# Patient Record
Sex: FEMALE | Race: OTHER | URBAN - METROPOLITAN AREA
[De-identification: names, ages, dates, MRNs, and addresses within clinical notes are randomized per-mention and may not be internally consistent; named-entity substitution may affect disease eponyms.]

---

## 2020-07-19 ENCOUNTER — EMERGENCY (EMERGENCY)
Facility: HOSPITAL | Age: 45
LOS: 0 days | Discharge: HOME | End: 2020-07-19
Attending: STUDENT IN AN ORGANIZED HEALTH CARE EDUCATION/TRAINING PROGRAM | Admitting: EMERGENCY MEDICINE
Payer: MEDICAID

## 2020-07-19 VITALS
RESPIRATION RATE: 18 BRPM | TEMPERATURE: 99 F | SYSTOLIC BLOOD PRESSURE: 114 MMHG | OXYGEN SATURATION: 99 % | HEART RATE: 68 BPM | DIASTOLIC BLOOD PRESSURE: 60 MMHG

## 2020-07-19 VITALS
WEIGHT: 190.04 LBS | RESPIRATION RATE: 18 BRPM | OXYGEN SATURATION: 100 % | DIASTOLIC BLOOD PRESSURE: 76 MMHG | TEMPERATURE: 100 F | HEART RATE: 86 BPM | SYSTOLIC BLOOD PRESSURE: 153 MMHG

## 2020-07-19 DIAGNOSIS — R06.02 SHORTNESS OF BREATH: ICD-10-CM

## 2020-07-19 LAB
ALBUMIN SERPL ELPH-MCNC: 4.3 G/DL — SIGNIFICANT CHANGE UP (ref 3.5–5.2)
ALP SERPL-CCNC: 51 U/L — SIGNIFICANT CHANGE UP (ref 30–115)
ALT FLD-CCNC: 17 U/L — SIGNIFICANT CHANGE UP (ref 0–41)
ANION GAP SERPL CALC-SCNC: 14 MMOL/L — SIGNIFICANT CHANGE UP (ref 7–14)
AST SERPL-CCNC: 15 U/L — SIGNIFICANT CHANGE UP (ref 0–41)
BASOPHILS # BLD AUTO: 0.02 K/UL — SIGNIFICANT CHANGE UP (ref 0–0.2)
BASOPHILS NFR BLD AUTO: 0.3 % — SIGNIFICANT CHANGE UP (ref 0–1)
BILIRUB SERPL-MCNC: <0.2 MG/DL — SIGNIFICANT CHANGE UP (ref 0.2–1.2)
BUN SERPL-MCNC: 13 MG/DL — SIGNIFICANT CHANGE UP (ref 10–20)
CALCIUM SERPL-MCNC: 9.1 MG/DL — SIGNIFICANT CHANGE UP (ref 8.5–10.1)
CHLORIDE SERPL-SCNC: 106 MMOL/L — SIGNIFICANT CHANGE UP (ref 98–110)
CO2 SERPL-SCNC: 18 MMOL/L — SIGNIFICANT CHANGE UP (ref 17–32)
CREAT SERPL-MCNC: 0.7 MG/DL — SIGNIFICANT CHANGE UP (ref 0.7–1.5)
D DIMER BLD IA.RAPID-MCNC: 428 NG/ML DDU — HIGH (ref 0–230)
EOSINOPHIL # BLD AUTO: 0.16 K/UL — SIGNIFICANT CHANGE UP (ref 0–0.7)
EOSINOPHIL NFR BLD AUTO: 2.1 % — SIGNIFICANT CHANGE UP (ref 0–8)
GLUCOSE SERPL-MCNC: 112 MG/DL — HIGH (ref 70–99)
HCT VFR BLD CALC: 37.8 % — SIGNIFICANT CHANGE UP (ref 37–47)
HGB BLD-MCNC: 12.7 G/DL — SIGNIFICANT CHANGE UP (ref 12–16)
IMM GRANULOCYTES NFR BLD AUTO: 0.3 % — SIGNIFICANT CHANGE UP (ref 0.1–0.3)
LYMPHOCYTES # BLD AUTO: 2.01 K/UL — SIGNIFICANT CHANGE UP (ref 1.2–3.4)
LYMPHOCYTES # BLD AUTO: 26.1 % — SIGNIFICANT CHANGE UP (ref 20.5–51.1)
MCHC RBC-ENTMCNC: 30.4 PG — SIGNIFICANT CHANGE UP (ref 27–31)
MCHC RBC-ENTMCNC: 33.6 G/DL — SIGNIFICANT CHANGE UP (ref 32–37)
MCV RBC AUTO: 90.4 FL — SIGNIFICANT CHANGE UP (ref 81–99)
MONOCYTES # BLD AUTO: 0.64 K/UL — HIGH (ref 0.1–0.6)
MONOCYTES NFR BLD AUTO: 8.3 % — SIGNIFICANT CHANGE UP (ref 1.7–9.3)
NEUTROPHILS # BLD AUTO: 4.86 K/UL — SIGNIFICANT CHANGE UP (ref 1.4–6.5)
NEUTROPHILS NFR BLD AUTO: 62.9 % — SIGNIFICANT CHANGE UP (ref 42.2–75.2)
NRBC # BLD: 0 /100 WBCS — SIGNIFICANT CHANGE UP (ref 0–0)
NT-PROBNP SERPL-SCNC: 60 PG/ML — SIGNIFICANT CHANGE UP (ref 0–300)
PLATELET # BLD AUTO: 253 K/UL — SIGNIFICANT CHANGE UP (ref 130–400)
POTASSIUM SERPL-MCNC: 4.1 MMOL/L — SIGNIFICANT CHANGE UP (ref 3.5–5)
POTASSIUM SERPL-SCNC: 4.1 MMOL/L — SIGNIFICANT CHANGE UP (ref 3.5–5)
PROT SERPL-MCNC: 7.2 G/DL — SIGNIFICANT CHANGE UP (ref 6–8)
RBC # BLD: 4.18 M/UL — LOW (ref 4.2–5.4)
RBC # FLD: 13.2 % — SIGNIFICANT CHANGE UP (ref 11.5–14.5)
SODIUM SERPL-SCNC: 138 MMOL/L — SIGNIFICANT CHANGE UP (ref 135–146)
TROPONIN T SERPL-MCNC: <0.01 NG/ML — SIGNIFICANT CHANGE UP
WBC # BLD: 7.71 K/UL — SIGNIFICANT CHANGE UP (ref 4.8–10.8)
WBC # FLD AUTO: 7.71 K/UL — SIGNIFICANT CHANGE UP (ref 4.8–10.8)

## 2020-07-19 PROCEDURE — 93010 ELECTROCARDIOGRAM REPORT: CPT

## 2020-07-19 PROCEDURE — 99285 EMERGENCY DEPT VISIT HI MDM: CPT

## 2020-07-19 PROCEDURE — 71045 X-RAY EXAM CHEST 1 VIEW: CPT | Mod: 26

## 2020-07-19 PROCEDURE — 71275 CT ANGIOGRAPHY CHEST: CPT | Mod: 26

## 2020-07-19 NOTE — ED PROVIDER NOTE - PROGRESS NOTE DETAILS
Pt signed out to Dr. Jones pending labs, reevaluation, and dispo. PP: Received sign out from Dr. Ortiz. Patient's D-dimer came back elevated, CT angio chest negative for PE. Patient reassessed, feels subjectively better. Discussed being admitted for further evaluation of SOB as inpatient, patient opts to go home and follow up with PMD and get workup outpatient if symptoms persist. Patient to be discharged from ED. Any available test results were discussed with patient and/or family. Verbal instructions given, including instructions to return to ED immediately for any new, worsening, or concerning symptoms. Patient endorsed understanding. Written discharge instructions additionally given, including follow-up plan.

## 2020-07-19 NOTE — ED PROVIDER NOTE - NS ED ROS FT
Eyes:  No visual changes, eye pain or discharge.  ENMT:  No hearing changes, pain, discharge or infections. No neck pain or stiffness.  Cardiac:  No chest pain, +SOB. No chest pain with exertion.  Respiratory:  No cough or respiratory distress. No hemoptysis. No history of asthma or RAD.  GI:  No nausea, vomiting, diarrhea or abdominal pain.  :  No dysuria, frequency or burning.  MS:  No myalgia, muscle weakness, joint pain or back pain.  Neuro:  No headache or weakness.  No LOC.  Skin:  No skin rash.   Endocrine: No history of thyroid disease or diabetes.

## 2020-07-19 NOTE — ED ADULT NURSE NOTE - OBJECTIVE STATEMENT
Pt c/o tremors, states she woke up from her sleep with tremors and SOB. Pt denies chest pains, no s/s respiratory distress. O2 Sat 100% on room air. No tremors observed at this time.

## 2020-07-19 NOTE — ED PROVIDER NOTE - NSFOLLOWUPCLINICS_GEN_ALL_ED_FT
[facility]  Medicine  [address], [place]   Phone: [phone]  Fax:   Follow Up Time: 4-6 Days

## 2020-07-19 NOTE — ED PROVIDER NOTE - OBJECTIVE STATEMENT
The patient is a 44 year old female with no PMH presenting for shortness of breath since last night. Patient states she woke up from sleep and could not catch her breath. shortness of breath is worse while laying down. has not had these symptoms before. Associated with a burning sensation in throat. No fevers, chest pain, nausea, vomiting, abdominal pain. No recent travel, surgeries, immobilizations, hemoptysis, hormone use.

## 2020-07-19 NOTE — ED ADULT NURSE NOTE - CHPI ED NUR SYMPTOMS NEG
no dizziness/no fever/no nausea/no tingling/no weakness/no chills/no pain/no vomiting/no decreased eating/drinking

## 2020-07-19 NOTE — ED PROVIDER NOTE - CLINICAL SUMMARY MEDICAL DECISION MAKING FREE TEXT BOX
Pt w/ SOB. resolved in ED. CTA neg PE. Work up negative for acute serious pathology requiring further emergent intervention or hospitalization. Pt stable for dc w/ PMD f/up, and care as discussed.  Pt/ family understands plan and signs and symptoms for ED return.

## 2020-07-19 NOTE — ED PROVIDER NOTE - PATIENT PORTAL LINK FT
You can access the FollowMyHealth Patient Portal offered by [facility] by registering at the following website: [email or web address]. By joining [employer]’s FollowMyHealth portal, you will also be able to view your health information using other applications (apps) compatible with our system.

## 2020-07-19 NOTE — ED PROVIDER NOTE - ATTENDING CONTRIBUTION TO CARE
44 y.o. female, no PMH, comes in c/o SOB which started last night, worse when laying flat, better when upright. States gets SOB when walking up the stairs. No CP. No fever/chills, cough, abdominal pain, n/v/c/d. No recent travel, surgeries, immobilizations, hemoptysis, hormone use. On exam, pt in NAD, AAOx3, head NC/AT, CN II-XII intact, lungs CTA B/L, CV S1S2 regular, abdomen soft/NT/ND/(+)BS, ext (-) edema, motor 5/5x4, sensation intact. Will do labs, CXR, EKG and reevaluate.

## 2024-09-19 ENCOUNTER — EMERGENCY (EMERGENCY)
Facility: HOSPITAL | Age: 49
LOS: 0 days | Discharge: HOME | End: 2024-09-19
Attending: EMERGENCY MEDICINE
Payer: MEDICAID

## 2024-09-19 VITALS
RESPIRATION RATE: 18 BRPM | HEART RATE: 82 BPM | SYSTOLIC BLOOD PRESSURE: 137 MMHG | DIASTOLIC BLOOD PRESSURE: 82 MMHG | WEIGHT: 190.04 LBS | TEMPERATURE: 99 F | OXYGEN SATURATION: 96 % | HEIGHT: 61 IN

## 2024-09-19 DIAGNOSIS — L02.212 CUTANEOUS ABSCESS OF BACK [ANY PART, EXCEPT BUTTOCK]: ICD-10-CM

## 2024-09-19 DIAGNOSIS — M79.7 FIBROMYALGIA: ICD-10-CM

## 2024-09-19 RX ORDER — CLINDAMYCIN PHOSPHATE 150 MG/ML
1 VIAL (ML) INJECTION
Qty: 21 | Refills: 0
Start: 2024-09-19 | End: 2024-09-25

## 2024-09-19 NOTE — ED PROVIDER NOTE - IV ALTEPLASE DOOR HIDDEN
Patient here today for lab review.  She called to have her labs checked that she has been having some intermittent dizzy spells and was concerned she may be anemic again.    Lab Results   Component Value Date    WBC 7.91 07/09/2021    HGB 12.8 07/09/2021    HCT 40.8 07/09/2021    MCV 92.1 07/09/2021     07/09/2021   Iron saturation 16%.  Lab Results   Component Value Date    IRON 53 07/09/2021    TIBC 325 07/09/2021    FERRITIN 102.60 07/09/2021     Labs reviewed with patient.  Recommend she follow-up with PCP for further evaluation of her complaints, or if symptoms worsening or concern of stroke go to the emergency room.  Patient verbalized understanding.    SUMMER Garcia   show

## 2024-09-19 NOTE — ED PROVIDER NOTE - CLINICAL SUMMARY MEDICAL DECISION MAKING FREE TEXT BOX
Patient tolerated the procedure well. There is no lymphangitic spread visible. Low concern for osteomyelitis. Patient is not immunocompromised, and there is no bullae, pain out of proportion, or rapid progression concerning for necrotizing fasciitis. Wound care instructions provided. They were instructed on signs and symptoms of wound infection, including pain, fever, redness, swelling, lymphangitis, sensory/motor deficits, and instructed for them to return to PCP or ED immediately should these symptoms present. They verbally expressed understanding and all questions were addressed to their satisfaction.

## 2024-09-19 NOTE — ED PROVIDER NOTE - OBJECTIVE STATEMENT
49-year-old female past medical history of fibromyalgia presents to the ED for evaluation of abscess.  Patient with 2 weeks of abscess to back, seen by surgery as outpatient had a I&D done.  Reports that swelling and pain returned was started on antibiotics Keflex which she finished 2 days ago but the pain has persisted.  Noticed some drainage of the area.  Spoke to her surgeon today was told to go to the ED.  Denies any fevers, chills, history of IV drug use, numbness, tingling, weakness

## 2024-09-19 NOTE — ED PROVIDER NOTE - NSFOLLOWUPINSTRUCTIONS_ED_ALL_ED_FT
Our Emergency Department Referral Coordinators will be reaching out to you in the next 24-48 hours from 9:00am to 5:00pm with a follow up appointment. Please expect a phone call from the hospital in that time frame. If you do not receive a call or if you have any questions or concerns, you can reach them at [phone].    Skin Abscess  Close-up of an abscess on the skin.  A skin abscess is an infected area on or under your skin. It contains pus and other material. An abscess may also be called a furuncle, carbuncle, or boil. It is often the result of an infection caused by bacteria. An abscess can occur in or on almost any part of your body.    Sometimes, an abscess may break open (rupture) on its own. In most cases, it will keep getting worse unless it is treated. An abscess can cause pain and make you feel ill. An untreated abscess can cause infection to spread to other parts of your body or your bloodstream. The abscess may need to be drained. You may also need to take antibiotics.    What are the causes?  An abscess occurs when germs, like bacteria, pass through your skin and cause an infection. This may be caused by:  A scrape or cut on your skin.  A puncture wound through your skin, such as a needle injection or insect bite.  Blocked oil or sweat glands.  Blocked and infected hair follicles.  A fluid-filled sac that forms beneath your skin (sebaceous cyst) and becomes infected.  What increases the risk?  You may be more likely to develop an abscess if:  You have problems with blood circulation, or you have a weak body defense system (immune system).  You have diabetes.  You have dry and irritated skin.  You get injections often or use IV drugs.  You have a foreign body in a wound, such as a splinter.  You smoke or use tobacco products.  What are the signs or symptoms?  Symptoms of this condition include:  A painful, firm bump under the skin.  A bump with pus at the top. This may break through the skin and drain.  Other symptoms include:  Redness and swelling around the abscess.  Warmth or tenderness.  Swelling of the lymph nodes (glands) near the abscess.  A sore on the skin.  How is this diagnosed?  This condition may be diagnosed based on a physical exam and your medical history. You may also have tests done, such as:  A test of a sample of pus. This may be done to find what is causing the infection.  Blood tests.  Imaging tests, such as an ultrasound, CT scan, or MRI.  How is this treated?  A small abscess that drains on its own may not need to be treated. Treatment for larger abscesses may include:  Moist heat or a heat pack applied to the area a few times a day.  Incision and drainage. This is a procedure to drain the abscess.  Antibiotics. For a severe abscess, you may first get antibiotics through an IV and then change to antibiotics by mouth.  Follow these instructions at home:  Medicines    Take over-the-counter and prescription medicines only as told by your provider.  If you were prescribed antibiotics, take them as told by your provider. Do not stop using the antibiotic even if you start to feel better.  Abscess care    Washing hands with soap and water.  If you have an abscess that has not drained, apply heat to the affected area. Use the heat source that your provider recommends, such as a moist heat pack or a heating pad.  Place a towel between your skin and the heat source.  Leave the heat on for 20–30 minutes at a time.  If your skin turns bright red, remove the heat right away to prevent burns. The risk of burns is higher if you cannot feel pain, heat, or cold.  Follow instructions from your provider about how to take care of your abscess. Make sure you:  Cover the abscess with a bandage (dressing).  Wash your hands with soap and water for at least 20 seconds before and after you change the dressing or gauze. If soap and water are not available, use hand .  Change your dressing or gauze as told by your provider.  Check your abscess every day for signs of an infection that is getting worse. Check for:  More redness, swelling, pain, or tenderness.  More fluid or blood.  Warmth.  More pus or a worse smell.  General instructions    To avoid spreading the infection:  Do not share personal care items, towels, or hot tubs with others.  Avoid making skin contact with other people.  Be careful when getting rid of used dressings, wound packing, or any drainage from the abscess.  Do not use any products that contain nicotine or tobacco. These products include cigarettes, chewing tobacco, and vaping devices, such as e-cigarettes. If you need help quitting, ask your provider.  Do not use any creams, ointments, or liquids unless you have been told to by your provider.  Contact a health care provider if:  You see redness that spreads quickly or red streaks on your skin spreading away from the abscess.  You have any signs of worse infection at the abscess.  You vomit every time you eat or drink.  You have a fever, chills, or muscle aches.  The cyst or abscess returns.  Get help right away if:  You have severe pain.  You make less pee (urine) than normal.  This information is not intended to replace advice given to you by your health care provider. Make sure you discuss any questions you have with your health care provider.

## 2024-09-19 NOTE — ED PROVIDER NOTE - ATTENDING APP SHARED VISIT CONTRIBUTION OF CARE
I have reviewed and agree with the mid-level note, except as documented in my note below.    49-year-old female denies significant PMH presents with pain and swelling to upper back for approximately 1 to 2 weeks, associated discharge, has been on oral by antibiotics until 2 days ago without resolution, denies fever, lymphangitis, evidence cellulitis, crepitus or other associated complaints at present. No old chart available for review. I have reviewed and agree with the initial nursing note, except as documented in my note.     VSS, awake, alert, non-toxic appearing, oropharynx clear, mmm, chest CTAB, non-labored breathing, no w/r/r, +S1/S2, RRR, no m/r/g, abdomen soft, NT, ND, +BS,  ABSCESS; left mid-upper back, approx size: 3x3cm, + fluctuance, induration, purulent drainage, erythema, no lymphangitis, crepitus, alert, clear speech and steady gait.

## 2024-09-19 NOTE — ED PROVIDER NOTE - PATIENT PORTAL LINK FT
You can access the FollowMyHealth Patient Portal offered by [facility] by registering at the following website: [email or web address]. By joining [employer]’s FollowMyHealth portal, you will also be able to view your health information using other applications (apps) compatible with our system.

## 2024-09-19 NOTE — ED PROVIDER NOTE - PHYSICAL EXAMINATION
CONST: Well appearing in NAD  MS: Normal ROM in all extremities. No midline spinal tenderness.  SKIN: 1 cm abscess to the L paraspinal area at the midthoracic area.   NEURO: A&Ox3, No focal deficits. Strength 5/5 with no sensory deficits. Steady gait

## 2025-06-04 NOTE — ED ADULT NURSE NOTE - MODE OF DISCHARGE
Ambulatory
If an upper endoscopy or enteroscopy was performed, you might have a sore throat for 1 to 2 days after the procedure. If it does not improve, please contact your doctor.